# Patient Record
Sex: MALE | Race: WHITE | NOT HISPANIC OR LATINO | URBAN - METROPOLITAN AREA
[De-identification: names, ages, dates, MRNs, and addresses within clinical notes are randomized per-mention and may not be internally consistent; named-entity substitution may affect disease eponyms.]

---

## 2019-12-02 ENCOUNTER — OUTPATIENT (OUTPATIENT)
Dept: OUTPATIENT SERVICES | Facility: HOSPITAL | Age: 19
LOS: 1 days | End: 2019-12-02

## 2019-12-02 DIAGNOSIS — Z00.00 ENCOUNTER FOR GENERAL ADULT MEDICAL EXAMINATION WITHOUT ABNORMAL FINDINGS: ICD-10-CM

## 2020-04-26 ENCOUNTER — MESSAGE (OUTPATIENT)
Age: 20
End: 2020-04-26

## 2020-06-15 PROBLEM — Z00.00 ENCOUNTER FOR PREVENTIVE HEALTH EXAMINATION: Noted: 2020-06-15

## 2020-06-17 ENCOUNTER — APPOINTMENT (OUTPATIENT)
Dept: ENDOCRINOLOGY | Facility: CLINIC | Age: 20
End: 2020-06-17
Payer: COMMERCIAL

## 2020-06-17 DIAGNOSIS — Z80.9 FAMILY HISTORY OF MALIGNANT NEOPLASM, UNSPECIFIED: ICD-10-CM

## 2020-06-17 DIAGNOSIS — Z83.49 FAMILY HISTORY OF OTHER ENDOCRINE, NUTRITIONAL AND METABOLIC DISEASES: ICD-10-CM

## 2020-06-17 DIAGNOSIS — Z83.3 FAMILY HISTORY OF DIABETES MELLITUS: ICD-10-CM

## 2020-06-17 PROCEDURE — 99205 OFFICE O/P NEW HI 60 MIN: CPT | Mod: 95

## 2020-07-02 ENCOUNTER — TRANSCRIPTION ENCOUNTER (OUTPATIENT)
Age: 20
End: 2020-07-02

## 2020-07-02 ENCOUNTER — APPOINTMENT (OUTPATIENT)
Dept: ENDOCRINOLOGY | Facility: CLINIC | Age: 20
End: 2020-07-02
Payer: COMMERCIAL

## 2020-07-02 PROCEDURE — 99214 OFFICE O/P EST MOD 30 MIN: CPT | Mod: 95

## 2020-07-23 ENCOUNTER — APPOINTMENT (OUTPATIENT)
Dept: ENDOCRINOLOGY | Facility: CLINIC | Age: 20
End: 2020-07-23
Payer: COMMERCIAL

## 2020-07-23 ENCOUNTER — APPOINTMENT (OUTPATIENT)
Dept: ENDOCRINOLOGY | Facility: CLINIC | Age: 20
End: 2020-07-23

## 2020-07-23 PROCEDURE — 99442: CPT

## 2020-07-24 NOTE — ASSESSMENT
[FreeTextEntry1] : Joseline is a 20 year old transfeminine person (AMAB, pronouns they/them) h/o bilateral orchiectomy, low testosterone. They meet the diagnosis of gender dysphoria (at least 6 months of incongruence between  gender of male and expressed gender of nonbinary/transfeminine associated with clinically significant distress). They had been treated with testosterone previously but experienced gender dysphoria on treatment and it was thus stopped. They signed an informed consent form on 20 to begin estradiol treatment. \par \par #Gender dysphoria: \par -start estradiol 0.025mg patch twice weekly. counseled on how to apply.\par -recheck bloodwork in 1 month and RTC\par \par #Hyperprolactinemia: resolved.\par \par #Elevated ALT: likely due to fatty liver. This level of mild elevation is not a contraindication to starting estradiol patch.\par -obtain RUQ US\par -referred to GI\par -repeat LFTs in 1 month \par \par #Obesity/prediabetes: losing weight with calorie counting\par -Continue metformin 500mg ER bid\par -continue to calorie restrict and increase exercise if they can. \par

## 2020-07-24 NOTE — DATA REVIEWED
[FreeTextEntry1] : [facility] 7/20/20: total prolactin 7.8, monomeric prolactin 7.8, Hep B SAb non reactive, Hep B Surface Ag neg, Hep C Ab with reflex to RNA non reactive, Hep A IgM non reactive, HIV Ag/Ab 4th gen with reflex non reactive\par \par [facility] on 6/24/20: cr 0.71, AST 39, ALT 73 H (range 9-46), K 3.8, LDL 94, HDL 42, TG 79, T chol 153, TSH 2.8, fT4 1.2, WBC 11.2, prolactin 38.9 H (ref 2-18), estradiol 32, testosterone total 29, WBC 11.2.

## 2020-07-24 NOTE — HISTORY OF PRESENT ILLNESS
[FreeTextEntry1] : [employer] in [place]: phone [phone]\par \par Preferred name: Joseline. Pronouns: they/them\par \par Joseline is a 20 year old person h/o bilateral orchiectomy, low testosterone here for followup.\par \par #Low testosterone:\par -was following with Dr. Campos and then another endocrinologist (pediatric endocrinologist) after bilateral orchiectomy at age 4.\par -started testosterone gel [id number]. In 2015 started experiencing gender dysphoria and no longer felt comfortable on testosterone. Endocrinologist stopped testosterone and started raloxifene to prevent osteoporosis. They took raloxifene for 3 years. Was told that they had low bone density as a teenager.\par \par #Gender dysphoria:\par -first started having gender dysphoria when they started testosterone. Improved since they've been off testosterone\par -started learning about nonbinary identities the second year of high school and they felt like they were nonbinary. Saw a therapist for 2 yrs through senior year of high school for self harm and suicidal ideation\par -currently identify as transfeminine: feels more comfortable seeing themselves as a feminine person. \par -areas that cause dysphoria: genitalia, facial appearance. Patient is comfortable having additional breast tissue. Pt does not grow a beard - only has scant hair on upper lip. Goals for hormone tx: bigger breasts, different body composition. \par -Joseline would like to proceed with estradiol patches. \par \par #hyperprolactinemia: resolved\par \par #High ALT: workup showed negative screen for hepatitis A,B,C, HIV. \par \par #Obesity:\par -we started metformin 500mg ER bid on 7/2/20. \par -Joseline has been tracking calories with Lose It arpita and trying to eat less than 1850 Kcal a day. They still have limited exercise.\par -Pt reports being 6'4" and is currently 279 lbs (BMI 34), having lost 4 lbs in the past 2 weeks\par \par ROS: Per HPI. Review of constitutional, eyes, ENT, cardiovascular, respiratory, gastrointestinal, genitourinary, musculoskeletal, integumentary, neurological, psychiatric, endocrine and heme/lymph systems is otherwise negative.

## 2020-08-06 ENCOUNTER — APPOINTMENT (OUTPATIENT)
Dept: GASTROENTEROLOGY | Facility: CLINIC | Age: 20
End: 2020-08-06
Payer: COMMERCIAL

## 2020-08-06 PROCEDURE — 99202 OFFICE O/P NEW SF 15 MIN: CPT | Mod: 95

## 2020-08-07 NOTE — ASSESSMENT
[FreeTextEntry1] : 20 with PMHx orchiectomy and low testosterone referred by Dr. Gupta for elevated ALT via TELEMEDICINE. \par \par High ALT\par - repeat CMP as only have one level of elevation \par - will do full liver work up and ultrasound if still elevated \par \par "Stomach ache"\par - patient reports lactose being trigger\par - trial of 3 months with no dairy\par \par f/u with results\par \par Joan Brower NP \par \par time spent 20min

## 2020-08-07 NOTE — HISTORY OF PRESENT ILLNESS
[Home] : at home, [unfilled] , at the time of the visit. [Medical Office: ([facility])___] : at the medical office located in  [Verbal consent obtained from patient] : the patient, [unfilled] [de-identified] : 20 with PMHx orchiectomy and low testosterone referred by Dr. Gupta for elevated ALT via TELEMEDICINE. \par \par First time blood work in 3 years and ALT came back high on June 24, 2020 (ALT- 73) \par \par frequent "stomach aches" - above and behind belly button  (thinks due to lactose intolerance and continuing to eat dairy) \par \par no ultrasound of stomach \par \par testosterone ([id number]) treatments in high school - saw endocrinologist for transition estradiol (1.5 weeks ago). Metformin began after high liver enzyme \par \par 6'4 , 276.7 \par counting calories and watching carb intake \par \par \par takes vitamin D and calcium supplement  \par \par Fhx: father had renal cancer \par Etoh: once or twice a week\par smoking: none\par drug use: none \par 2004 orchiectomy

## 2020-08-13 ENCOUNTER — RX CHANGE (OUTPATIENT)
Age: 20
End: 2020-08-13

## 2020-09-04 ENCOUNTER — RX RENEWAL (OUTPATIENT)
Age: 20
End: 2020-09-04

## 2020-09-18 ENCOUNTER — APPOINTMENT (OUTPATIENT)
Dept: ENDOCRINOLOGY | Facility: CLINIC | Age: 20
End: 2020-09-18
Payer: COMMERCIAL

## 2020-09-18 PROCEDURE — 99213 OFFICE O/P EST LOW 20 MIN: CPT | Mod: 95

## 2020-09-18 RX ORDER — ESTRADIOL 0.03 MG/D
0.03 PATCH, EXTENDED RELEASE TRANSDERMAL
Qty: 8 | Refills: 1 | Status: COMPLETED | COMMUNITY
Start: 2020-07-23 | End: 2020-09-18

## 2020-09-22 ENCOUNTER — RX RENEWAL (OUTPATIENT)
Age: 20
End: 2020-09-22

## 2020-10-16 ENCOUNTER — RX RENEWAL (OUTPATIENT)
Age: 20
End: 2020-10-16

## 2020-11-05 ENCOUNTER — RX CHANGE (OUTPATIENT)
Age: 20
End: 2020-11-05

## 2020-11-12 ENCOUNTER — RX RENEWAL (OUTPATIENT)
Age: 20
End: 2020-11-12

## 2020-12-28 ENCOUNTER — APPOINTMENT (OUTPATIENT)
Dept: ENDOCRINOLOGY | Facility: CLINIC | Age: 20
End: 2020-12-28
Payer: COMMERCIAL

## 2020-12-28 DIAGNOSIS — Z86.39 PERSONAL HISTORY OF OTHER ENDOCRINE, NUTRITIONAL AND METABOLIC DISEASE: ICD-10-CM

## 2020-12-28 DIAGNOSIS — Z00.00 ENCOUNTER FOR GENERAL ADULT MEDICAL EXAMINATION W/OUT ABNORMAL FINDINGS: ICD-10-CM

## 2020-12-28 PROCEDURE — 99213 OFFICE O/P EST LOW 20 MIN: CPT | Mod: 95

## 2020-12-28 RX ORDER — ESTRADIOL 0.05 MG/D
0.05 PATCH, EXTENDED RELEASE TRANSDERMAL
Qty: 8 | Refills: 1 | Status: COMPLETED | COMMUNITY
Start: 2020-09-18 | End: 2020-12-28

## 2021-01-20 ENCOUNTER — RX RENEWAL (OUTPATIENT)
Age: 21
End: 2021-01-20

## 2021-01-21 ENCOUNTER — APPOINTMENT (OUTPATIENT)
Dept: GASTROENTEROLOGY | Facility: CLINIC | Age: 21
End: 2021-01-21
Payer: COMMERCIAL

## 2021-01-21 ENCOUNTER — APPOINTMENT (OUTPATIENT)
Dept: GASTROENTEROLOGY | Facility: CLINIC | Age: 21
End: 2021-01-21

## 2021-01-21 PROCEDURE — 99213 OFFICE O/P EST LOW 20 MIN: CPT | Mod: 95

## 2021-01-22 NOTE — ASSESSMENT
[FreeTextEntry1] : 20 with PMHx orchiectomy and low testosterone referred by Dr. Gupta for elevated LTs:\par \par Elevated LT s: prior ALT 73, trend upwards> 76> 83, recent AST also slightly elevated at 41, bili/ALP normal\par -no new meds or supplements , no alc use \par -check liver tox metformin cat B, estradiol cat A but both were started after ALt already up\par -will pursue full liver workup, including repeat LTs, hepatitis panel, MARJ,AMA,ALKM,ASMA,IgG,iron panel, ceruloplasmin\par -will check RUQ US with Doppler\par -Counselled about avoiding alc and herbal supplements \par -will follow up after above workup

## 2021-01-22 NOTE — HISTORY OF PRESENT ILLNESS
[Home] : at home, [unfilled] , at the time of the visit. [Medical Office: ([facility])___] : at the medical office located in  [Verbal consent obtained from patient] : the patient, [unfilled] [Heartburn] : denies heartburn [Nausea] : denies nausea [Vomiting] : denies vomiting [Diarrhea] : denies diarrhea [Constipation] : denies constipation [Yellow Skin Or Eyes (Jaundice)] : denies jaundice [Abdominal Swelling] : denies abdominal swelling [Abdominal Pain] : denies abdominal pain [Rectal Pain] : denies rectal pain [de-identified] : 20 with PMHx orchiectomy and low testosterone referred by Dr. Gupta for elevated ALT \par \par Reports feels well, only take estradiol and metformin and they both were started after ALT elevation was found. \par Had repeat labs in sep and nov 2020 and is for follow up regarding the same \par \par takes vitamin D and calcium supplement , no herbal supplements \par \par Fhx: father had renal cancer , no f/h of liver disease \par Etoh: none for the last one year, occasionally before that \par smoking: none\par drug use: none \par 2004 orchiectomy.

## 2021-04-07 ENCOUNTER — NON-APPOINTMENT (OUTPATIENT)
Age: 21
End: 2021-04-07

## 2021-04-07 LAB
ALBUMIN SERPL ELPH-MCNC: 4.8 G/DL
ALBUMIN SERPL ELPH-MCNC: 4.8 G/DL
ALP BLD-CCNC: 118 U/L
ALP BLD-CCNC: 119 U/L
ALT SERPL-CCNC: 36 U/L
ALT SERPL-CCNC: 39 U/L
ANION GAP SERPL CALC-SCNC: 19 MMOL/L
AST SERPL-CCNC: 22 U/L
AST SERPL-CCNC: 23 U/L
BASOPHILS # BLD AUTO: 0.08 K/UL
BASOPHILS NFR BLD AUTO: 0.9 %
BILIRUB DIRECT SERPL-MCNC: 0.1 MG/DL
BILIRUB INDIRECT SERPL-MCNC: 0.3 MG/DL
BILIRUB SERPL-MCNC: 0.4 MG/DL
BILIRUB SERPL-MCNC: 0.4 MG/DL
BUN SERPL-MCNC: 11 MG/DL
CALCIUM SERPL-MCNC: 9.9 MG/DL
CERULOPLASMIN SERPL-MCNC: 33 MG/DL
CHLORIDE SERPL-SCNC: 99 MMOL/L
CHOLEST SERPL-MCNC: 159 MG/DL
CO2 SERPL-SCNC: 23 MMOL/L
CREAT SERPL-MCNC: 0.67 MG/DL
CREAT SERPL-MCNC: 0.72 MG/DL
EOSINOPHIL # BLD AUTO: 0.16 K/UL
EOSINOPHIL NFR BLD AUTO: 1.8 %
ESTIMATED AVERAGE GLUCOSE: 108 MG/DL
ESTRADIOL SERPL-MCNC: 61 PG/ML
FERRITIN SERPL-MCNC: 58 NG/ML
GLUCOSE SERPL-MCNC: 104 MG/DL
HBA1C MFR BLD HPLC: 5.4 %
HBV CORE IGG+IGM SER QL: NONREACTIVE
HBV SURFACE AB SERPL IA-ACNC: 3.6 MIU/ML
HBV SURFACE AG SER QL: NONREACTIVE
HCT VFR BLD CALC: 45.1 %
HCV AB SER QL: NONREACTIVE
HCV S/CO RATIO: 0.1 S/CO
HDLC SERPL-MCNC: 41 MG/DL
HEPATITIS A IGG ANTIBODY: REACTIVE
HGB BLD-MCNC: 14.4 G/DL
IGG SER QL IEP: 1373 MG/DL
IMM GRANULOCYTES NFR BLD AUTO: 0.2 %
INR PPP: 0.87 RATIO
IRON SATN MFR SERPL: 15 %
IRON SERPL-MCNC: 62 UG/DL
LDLC SERPL CALC-MCNC: 101 MG/DL
LYMPHOCYTES # BLD AUTO: 1.92 K/UL
LYMPHOCYTES NFR BLD AUTO: 21.7 %
MAN DIFF?: NORMAL
MCHC RBC-ENTMCNC: 27.6 PG
MCHC RBC-ENTMCNC: 31.9 GM/DL
MCV RBC AUTO: 86.6 FL
MONOCYTES # BLD AUTO: 0.51 K/UL
MONOCYTES NFR BLD AUTO: 5.8 %
NEUTROPHILS # BLD AUTO: 6.17 K/UL
NEUTROPHILS NFR BLD AUTO: 69.6 %
NONHDLC SERPL-MCNC: 118 MG/DL
PLATELET # BLD AUTO: 275 K/UL
POTASSIUM SERPL-SCNC: 4 MMOL/L
PROLACTIN SERPL-MCNC: 10.9 NG/ML
PROT SERPL-MCNC: 8 G/DL
PROT SERPL-MCNC: 8.2 G/DL
PT BLD: 10.3 SEC
RBC # BLD: 5.21 M/UL
RBC # FLD: 13.1 %
SODIUM SERPL-SCNC: 141 MMOL/L
TIBC SERPL-MCNC: 398 UG/DL
TRIGL SERPL-MCNC: 83 MG/DL
UIBC SERPL-MCNC: 336 UG/DL
WBC # FLD AUTO: 8.86 K/UL

## 2021-04-08 ENCOUNTER — APPOINTMENT (OUTPATIENT)
Dept: ENDOCRINOLOGY | Facility: CLINIC | Age: 21
End: 2021-04-08
Payer: COMMERCIAL

## 2021-04-08 DIAGNOSIS — R79.89 OTHER SPECIFIED ABNORMAL FINDINGS OF BLOOD CHEMISTRY: ICD-10-CM

## 2021-04-08 PROCEDURE — 99212 OFFICE O/P EST SF 10 MIN: CPT | Mod: 95

## 2021-04-08 RX ORDER — METFORMIN ER 500 MG 500 MG/1
500 TABLET ORAL
Qty: 60 | Refills: 2 | Status: COMPLETED | COMMUNITY
Start: 2020-07-02 | End: 2021-04-08

## 2021-04-12 LAB
ANA SER IF-ACNC: NEGATIVE
LKM AB SER QL IF: <20.1 UNITS
MITOCHONDRIA AB SER IF-ACNC: NORMAL
SMOOTH MUSCLE AB SER QL IF: NORMAL

## 2021-11-12 ENCOUNTER — NON-APPOINTMENT (OUTPATIENT)
Age: 21
End: 2021-11-12

## 2021-11-24 ENCOUNTER — APPOINTMENT (OUTPATIENT)
Dept: ENDOCRINOLOGY | Facility: CLINIC | Age: 21
End: 2021-11-24
Payer: COMMERCIAL

## 2021-11-24 ENCOUNTER — NON-APPOINTMENT (OUTPATIENT)
Age: 21
End: 2021-11-24

## 2021-11-24 VITALS
BODY MASS INDEX: 34.7 KG/M2 | HEIGHT: 76 IN | DIASTOLIC BLOOD PRESSURE: 74 MMHG | WEIGHT: 285 LBS | SYSTOLIC BLOOD PRESSURE: 135 MMHG | HEART RATE: 108 BPM

## 2021-11-24 DIAGNOSIS — R74.01 ELEVATION OF LEVELS OF LIVER TRANSAMINASE LEVELS: ICD-10-CM

## 2021-11-24 PROCEDURE — 99215 OFFICE O/P EST HI 40 MIN: CPT

## 2021-11-24 RX ORDER — BLOOD-GLUCOSE METER
70 EACH MISCELLANEOUS
Qty: 100 | Refills: 2 | Status: ACTIVE | COMMUNITY
Start: 2021-11-24 | End: 1900-01-01

## 2021-11-24 RX ORDER — CONTAINER,EMPTY
EACH MISCELLANEOUS
Qty: 1 | Refills: 5 | Status: ACTIVE | COMMUNITY
Start: 2021-11-24 | End: 1900-01-01

## 2021-11-24 RX ORDER — ESTRADIOL 0.1 MG/D
0.1 PATCH TRANSDERMAL
Qty: 24 | Refills: 1 | Status: COMPLETED | COMMUNITY
Start: 2020-12-28 | End: 2021-11-24

## 2021-11-29 LAB
ALBUMIN SERPL ELPH-MCNC: 4.8 G/DL
ALP BLD-CCNC: 112 U/L
ALT SERPL-CCNC: 67 U/L
ANION GAP SERPL CALC-SCNC: 15 MMOL/L
AST SERPL-CCNC: 30 U/L
BILIRUB SERPL-MCNC: 0.3 MG/DL
BUN SERPL-MCNC: 17 MG/DL
CALCIUM SERPL-MCNC: 10.1 MG/DL
CHLORIDE SERPL-SCNC: 103 MMOL/L
CO2 SERPL-SCNC: 24 MMOL/L
CREAT SERPL-MCNC: 0.72 MG/DL
ESTIMATED AVERAGE GLUCOSE: 117 MG/DL
ESTRADIOL SERPL-MCNC: 15 PG/ML
GLUCOSE SERPL-MCNC: 107 MG/DL
HBA1C MFR BLD HPLC: 5.7 %
POTASSIUM SERPL-SCNC: 4.6 MMOL/L
PROT SERPL-MCNC: 8.2 G/DL
SODIUM SERPL-SCNC: 142 MMOL/L
TESTOST SERPL-MCNC: 19.6 NG/DL

## 2021-12-09 ENCOUNTER — RESULT REVIEW (OUTPATIENT)
Age: 21
End: 2021-12-09

## 2021-12-09 ENCOUNTER — APPOINTMENT (OUTPATIENT)
Dept: RADIOLOGY | Facility: CLINIC | Age: 21
End: 2021-12-09
Payer: SUBSIDIZED

## 2021-12-09 PROCEDURE — 77085 DXA BONE DENSITY AXL VRT FX: CPT | Mod: 26

## 2022-01-24 ENCOUNTER — APPOINTMENT (OUTPATIENT)
Dept: ENDOCRINOLOGY | Facility: CLINIC | Age: 22
End: 2022-01-24
Payer: COMMERCIAL

## 2022-01-24 VITALS — DIASTOLIC BLOOD PRESSURE: 88 MMHG | HEART RATE: 98 BPM | WEIGHT: 299 LBS | SYSTOLIC BLOOD PRESSURE: 137 MMHG

## 2022-01-24 PROCEDURE — 99214 OFFICE O/P EST MOD 30 MIN: CPT

## 2022-01-24 RX ORDER — ESTRADIOL 2 MG/1
2 TABLET ORAL
Qty: 120 | Refills: 0 | Status: COMPLETED | COMMUNITY
Start: 2021-11-24 | End: 2022-01-24

## 2022-01-25 LAB
ALBUMIN SERPL ELPH-MCNC: 4.8 G/DL
ALP BLD-CCNC: 117 U/L
ALT SERPL-CCNC: 55 U/L
AST SERPL-CCNC: 33 U/L
BILIRUB DIRECT SERPL-MCNC: 0.1 MG/DL
BILIRUB INDIRECT SERPL-MCNC: 0.2 MG/DL
BILIRUB SERPL-MCNC: 0.3 MG/DL
ESTIMATED AVERAGE GLUCOSE: 114 MG/DL
ESTRADIOL SERPL-MCNC: 26 PG/ML
HBA1C MFR BLD HPLC: 5.6 %
PROT SERPL-MCNC: 7.8 G/DL

## 2022-01-31 ENCOUNTER — APPOINTMENT (OUTPATIENT)
Dept: ENDOCRINOLOGY | Facility: CLINIC | Age: 22
End: 2022-01-31
Payer: COMMERCIAL

## 2022-01-31 PROCEDURE — 99213 OFFICE O/P EST LOW 20 MIN: CPT

## 2022-01-31 NOTE — PHYSICAL EXAM
[Alert] : alert [No Acute Distress] : no acute distress [Normal Sclera/Conjunctiva] : normal sclera/conjunctiva [Normal Hearing] : hearing was normal [No Respiratory Distress] : no respiratory distress [No Accessory Muscle Use] : no accessory muscle use [Normal Rate and Effort] : normal respiratory rate and effort [Normal Gait] : normal gait [No Involuntary Movements] : no involuntary movements were seen [Normal Strength/Tone] : muscle strength and tone were normal [No Rash] : no rash [No Skin Lesions] : no skin lesions [No Tremors] : no tremors [Oriented x3] : oriented to person, place, and time [Normal Affect] : the affect was normal [Normal Insight/Judgement] : insight and judgment were intact [Normal Mood] : the mood was normal

## 2022-01-31 NOTE — HISTORY OF PRESENT ILLNESS
[FreeTextEntry1] : Josleine is a 21 year old transfeminine person h/o bilateral orchiectomy, low testosterone who started a feminizing regimen 7/24/20. \par They are here for injection training.\par Estradiol 0.5ml IM every 2 weeks ordered by Dr Gupta.\par They brought all supplies and medication.\par We reviewed drawing up medication dose and injecting IM into thigh.

## 2022-01-31 NOTE — ASSESSMENT
[FreeTextEntry1] : Joseline is a 21 year old transfeminine person h/o bilateral orchiectomy, low testosterone who started a feminizing regimen 7/24/20 presents for injection training.\par They successfully colton up estradiol 0.5ml and self-injected IM into right outer third of thigh. They tolerated well. All questions answered. They feel comfortable self-injecting at home going forward.\par Provided written instructions and directed to internet tutorials if reinforcements is needed when self-administering in 2 weeks.\par  [Injection Technique, Storage, Sharps Disposal] : injection technique, storage, and sharps disposal

## 2022-02-08 RX ORDER — ESTRADIOL 2 MG/1
2 TABLET ORAL TWICE DAILY
Qty: 28 | Refills: 0 | Status: COMPLETED | COMMUNITY
Start: 2022-01-24 | End: 2022-02-08

## 2022-04-18 LAB — TESTOST SERPL-MCNC: 26.3 NG/DL

## 2022-04-25 ENCOUNTER — APPOINTMENT (OUTPATIENT)
Dept: ENDOCRINOLOGY | Facility: CLINIC | Age: 22
End: 2022-04-25
Payer: COMMERCIAL

## 2022-04-25 VITALS — SYSTOLIC BLOOD PRESSURE: 127 MMHG | WEIGHT: 311 LBS | DIASTOLIC BLOOD PRESSURE: 82 MMHG | HEART RATE: 111 BPM

## 2022-04-25 PROCEDURE — 99215 OFFICE O/P EST HI 40 MIN: CPT

## 2022-04-26 LAB
ALBUMIN SERPL ELPH-MCNC: 4.4 G/DL
ALP BLD-CCNC: 127 U/L
ALT SERPL-CCNC: 72 U/L
AST SERPL-CCNC: 38 U/L
BILIRUB DIRECT SERPL-MCNC: 0.1 MG/DL
BILIRUB INDIRECT SERPL-MCNC: 0.2 MG/DL
BILIRUB SERPL-MCNC: 0.2 MG/DL
ESTRADIOL SERPL-MCNC: 77 PG/ML
PROLACTIN SERPL-MCNC: 12.1 NG/ML
PROT SERPL-MCNC: 7.8 G/DL

## 2022-04-28 ENCOUNTER — TRANSCRIPTION ENCOUNTER (OUTPATIENT)
Age: 22
End: 2022-04-28

## 2022-04-28 RX ORDER — ESTRADIOL VALERATE 20 MG/ML
20 INJECTION INTRAMUSCULAR
Qty: 15 | Refills: 2 | Status: COMPLETED | COMMUNITY
Start: 2021-11-24 | End: 2022-04-28

## 2022-05-10 ENCOUNTER — NON-APPOINTMENT (OUTPATIENT)
Age: 22
End: 2022-05-10

## 2022-05-10 RX ORDER — ESTRADIOL VALERATE 40 MG/ML
40 INJECTION INTRAMUSCULAR
Qty: 1 | Refills: 1 | Status: COMPLETED | COMMUNITY
Start: 2022-04-28 | End: 2022-05-10

## 2022-07-11 ENCOUNTER — LABORATORY RESULT (OUTPATIENT)
Age: 22
End: 2022-07-11

## 2022-07-18 ENCOUNTER — APPOINTMENT (OUTPATIENT)
Dept: ENDOCRINOLOGY | Facility: CLINIC | Age: 22
End: 2022-07-18

## 2022-07-18 VITALS
BODY MASS INDEX: 37.99 KG/M2 | SYSTOLIC BLOOD PRESSURE: 147 MMHG | DIASTOLIC BLOOD PRESSURE: 75 MMHG | HEART RATE: 108 BPM | WEIGHT: 312 LBS | HEIGHT: 76 IN

## 2022-07-18 PROCEDURE — 99214 OFFICE O/P EST MOD 30 MIN: CPT

## 2022-10-10 ENCOUNTER — APPOINTMENT (OUTPATIENT)
Dept: ENDOCRINOLOGY | Facility: CLINIC | Age: 22
End: 2022-10-10

## 2022-10-10 VITALS — DIASTOLIC BLOOD PRESSURE: 71 MMHG | WEIGHT: 312 LBS | HEART RATE: 82 BPM | SYSTOLIC BLOOD PRESSURE: 108 MMHG

## 2022-10-10 PROCEDURE — 99215 OFFICE O/P EST HI 40 MIN: CPT

## 2022-12-27 ENCOUNTER — TRANSCRIPTION ENCOUNTER (OUTPATIENT)
Age: 22
End: 2022-12-27

## 2023-01-09 ENCOUNTER — APPOINTMENT (OUTPATIENT)
Dept: ENDOCRINOLOGY | Facility: CLINIC | Age: 23
End: 2023-01-09
Payer: COMMERCIAL

## 2023-01-09 VITALS — WEIGHT: 303 LBS | DIASTOLIC BLOOD PRESSURE: 75 MMHG | SYSTOLIC BLOOD PRESSURE: 141 MMHG | HEART RATE: 97 BPM

## 2023-01-09 DIAGNOSIS — R79.89 OTHER SPECIFIED ABNORMAL FINDINGS OF BLOOD CHEMISTRY: ICD-10-CM

## 2023-01-09 PROCEDURE — 99214 OFFICE O/P EST MOD 30 MIN: CPT

## 2023-02-13 ENCOUNTER — RX RENEWAL (OUTPATIENT)
Age: 23
End: 2023-02-13

## 2023-04-17 ENCOUNTER — APPOINTMENT (OUTPATIENT)
Dept: ENDOCRINOLOGY | Facility: CLINIC | Age: 23
End: 2023-04-17
Payer: COMMERCIAL

## 2023-04-17 ENCOUNTER — RX RENEWAL (OUTPATIENT)
Age: 23
End: 2023-04-17

## 2023-04-17 VITALS — SYSTOLIC BLOOD PRESSURE: 129 MMHG | DIASTOLIC BLOOD PRESSURE: 74 MMHG | WEIGHT: 306 LBS | HEART RATE: 91 BPM

## 2023-04-17 PROCEDURE — 99214 OFFICE O/P EST MOD 30 MIN: CPT

## 2023-04-17 RX ORDER — ORAL SEMAGLUTIDE 7 MG/1
7 TABLET ORAL DAILY
Qty: 90 | Refills: 1 | Status: COMPLETED | COMMUNITY
Start: 2022-12-27 | End: 2023-04-17

## 2023-04-20 ENCOUNTER — NON-APPOINTMENT (OUTPATIENT)
Age: 23
End: 2023-04-20

## 2023-05-16 ENCOUNTER — NON-APPOINTMENT (OUTPATIENT)
Age: 23
End: 2023-05-16

## 2023-05-23 ENCOUNTER — NON-APPOINTMENT (OUTPATIENT)
Age: 23
End: 2023-05-23

## 2023-06-23 ENCOUNTER — APPOINTMENT (OUTPATIENT)
Dept: PLASTIC SURGERY | Facility: CLINIC | Age: 23
End: 2023-06-23
Payer: COMMERCIAL

## 2023-06-23 ENCOUNTER — NON-APPOINTMENT (OUTPATIENT)
Age: 23
End: 2023-06-23

## 2023-06-23 VITALS
WEIGHT: 310 LBS | HEIGHT: 76 IN | RESPIRATION RATE: 16 BRPM | SYSTOLIC BLOOD PRESSURE: 124 MMHG | OXYGEN SATURATION: 94 % | BODY MASS INDEX: 37.75 KG/M2 | HEART RATE: 89 BPM | DIASTOLIC BLOOD PRESSURE: 79 MMHG

## 2023-06-23 PROCEDURE — 99204 OFFICE O/P NEW MOD 45 MIN: CPT

## 2023-06-23 NOTE — HISTORY OF PRESENT ILLNESS
[FreeTextEntry1] : 23-year-old woman designated male at birth (Joseline; she/they) presents for consultation for vaginoplasty.  She is interested in vaginal receptive intercourse with fingers, toys, and, possibly, penises.  She identifies as lesbian.  She has been on feminizing hormones for over 2 years.  She has a history of bilateral orchiectomy at age 1 for undescended testes.  She has not yet started hair removal.  She tucks most frequently with compressive underwear, rarely with tape.\par \par She works as a  for [employer].  She lives alone, but states she has several close friends, including one who is a a nurse, in the same building.  She denies nicotine use, occasionally drinks alcohol, and occasionally uses marijuana edibles.  She does not smoke.

## 2023-06-23 NOTE — PHYSICAL EXAM
[de-identified] : NAD.  BMI 37.7 [de-identified] : Normal respiratory effort [de-identified] : Genital exam was performed with an MA chaperone present (NQ).  There are testicular implants palpable adjacent to the midline of the nonpendulous scrotum.  There are no palpable hernias.  She is circumcised.  There are no visible skin lesions.  There is extremely limited available local tissue.

## 2023-06-23 NOTE — REVIEW OF SYSTEMS
[Vision Problems] : vision problems [Suicidal Ideation/Self-Harm] : suicidal ideation/self-harm [Negative] : Hematologic [MED-ROS-Eyes-FT] : Corrective lenses [MED-ROS-Psych-FT] : None for the last 7 years

## 2023-06-23 NOTE — ASSESSMENT
[FreeTextEntry1] : The patient has significant tissue limitations that preclude a typical penile inversion vaginoplasty.  She would require significant additional tissue from another site, and the creation of labia minora will be significantly limited.  A vulvoplasty would also have aesthetic limitations in this patient.  However, it is the opinion of this provider that vaginoplasty following vulvoplasty would not be significantly more difficult than vaginoplasty prior to vulvoplasty in this patient.\par \par  This patient might best be served by a peritoneal flap technique, which this center is unable to provide.  An intestinal vaginoplasty technique would be an option for this patient, as well, though I would recommend that only in the case of a needed revision.\par \par The patient was invited to obtain outside opinions, and return for further discussion should she desire.

## 2023-07-13 ENCOUNTER — NON-APPOINTMENT (OUTPATIENT)
Age: 23
End: 2023-07-13

## 2023-07-17 ENCOUNTER — RX RENEWAL (OUTPATIENT)
Age: 23
End: 2023-07-17

## 2023-08-18 ENCOUNTER — APPOINTMENT (OUTPATIENT)
Dept: ENDOCRINOLOGY | Facility: CLINIC | Age: 23
End: 2023-08-18
Payer: COMMERCIAL

## 2023-08-18 PROCEDURE — 99214 OFFICE O/P EST MOD 30 MIN: CPT | Mod: 95

## 2023-08-18 RX ORDER — SEMAGLUTIDE 0.68 MG/ML
2 INJECTION, SOLUTION SUBCUTANEOUS
Qty: 3 | Refills: 2 | Status: COMPLETED | COMMUNITY
Start: 2023-04-17 | End: 2023-08-18

## 2023-08-18 RX ORDER — SEMAGLUTIDE 1.34 MG/ML
2 INJECTION, SOLUTION SUBCUTANEOUS
Qty: 3 | Refills: 0 | Status: COMPLETED | COMMUNITY
Start: 2022-10-10 | End: 2023-08-18

## 2023-08-25 ENCOUNTER — RX RENEWAL (OUTPATIENT)
Age: 23
End: 2023-08-25

## 2023-08-25 RX ORDER — ESTRADIOL VALERATE 20 MG/ML
20 INJECTION INTRAMUSCULAR
Qty: 5 | Refills: 2 | Status: ACTIVE | COMMUNITY
Start: 2022-05-10 | End: 1900-01-01

## 2023-09-07 RX ORDER — SEMAGLUTIDE 0.25 MG/.5ML
0.25 INJECTION, SOLUTION SUBCUTANEOUS
Qty: 1 | Refills: 0 | Status: COMPLETED | COMMUNITY
Start: 2023-08-18 | End: 2023-09-07

## 2023-10-19 ENCOUNTER — APPOINTMENT (OUTPATIENT)
Dept: RADIOLOGY | Facility: CLINIC | Age: 23
End: 2023-10-19
Payer: SUBSIDIZED

## 2023-10-19 ENCOUNTER — OUTPATIENT (OUTPATIENT)
Dept: OUTPATIENT SERVICES | Facility: HOSPITAL | Age: 23
LOS: 1 days | End: 2023-10-19

## 2023-10-19 ENCOUNTER — RESULT REVIEW (OUTPATIENT)
Age: 23
End: 2023-10-19

## 2023-10-19 PROCEDURE — 77080 DXA BONE DENSITY AXIAL: CPT | Mod: 26

## 2023-11-16 RX ORDER — PROGESTERONE 100 MG/1
100 CAPSULE ORAL
Qty: 90 | Refills: 1 | Status: ACTIVE | COMMUNITY
Start: 2021-04-08 | End: 1900-01-01

## 2024-01-09 ENCOUNTER — APPOINTMENT (OUTPATIENT)
Dept: ENDOCRINOLOGY | Facility: CLINIC | Age: 24
End: 2024-01-09
Payer: COMMERCIAL

## 2024-01-09 VITALS
HEART RATE: 98 BPM | DIASTOLIC BLOOD PRESSURE: 79 MMHG | SYSTOLIC BLOOD PRESSURE: 132 MMHG | WEIGHT: 307 LBS | BODY MASS INDEX: 37.37 KG/M2

## 2024-01-09 DIAGNOSIS — F64.9 GENDER IDENTITY DISORDER, UNSPECIFIED: ICD-10-CM

## 2024-01-09 DIAGNOSIS — R73.03 PREDIABETES.: ICD-10-CM

## 2024-01-09 DIAGNOSIS — E66.9 OBESITY, UNSPECIFIED: ICD-10-CM

## 2024-01-09 DIAGNOSIS — K76.0 FATTY (CHANGE OF) LIVER, NOT ELSEWHERE CLASSIFIED: ICD-10-CM

## 2024-01-09 PROCEDURE — 99214 OFFICE O/P EST MOD 30 MIN: CPT

## 2024-01-09 RX ORDER — SEMAGLUTIDE 0.5 MG/.5ML
0.5 INJECTION, SOLUTION SUBCUTANEOUS
Qty: 3 | Refills: 1 | Status: COMPLETED | COMMUNITY
Start: 2023-09-07 | End: 2024-01-09

## 2024-04-17 RX ORDER — SYRINGE, DISPOSABLE, 1 ML
1 ML SYRINGE, EMPTY DISPOSABLE MISCELLANEOUS
Qty: 12 | Refills: 3 | Status: COMPLETED | COMMUNITY
Start: 2021-11-24 | End: 2024-04-17

## 2024-04-17 RX ORDER — SYRINGE WITH NEEDLE, 1 ML 25GX5/8"
23G X 1-1/2" SYRINGE, EMPTY DISPOSABLE MISCELLANEOUS
Qty: 12 | Refills: 3 | Status: ACTIVE | COMMUNITY
Start: 2023-04-17 | End: 1900-01-01

## 2024-04-17 RX ORDER — SYRINGE, DISPOSABLE, 1 ML
1 ML SYRINGE, EMPTY DISPOSABLE MISCELLANEOUS
Qty: 12 | Refills: 3 | Status: ACTIVE | COMMUNITY
Start: 2023-04-17 | End: 1900-01-01

## 2024-04-17 RX ORDER — NEEDLES, DISPOSABLE 25GX5/8"
20G X 1-1/2" NEEDLE, DISPOSABLE MISCELLANEOUS
Qty: 12 | Refills: 3 | Status: ACTIVE | COMMUNITY
Start: 2023-04-17 | End: 1900-01-01

## 2024-06-24 ENCOUNTER — TRANSCRIPTION ENCOUNTER (OUTPATIENT)
Age: 24
End: 2024-06-24

## 2024-08-12 ENCOUNTER — APPOINTMENT (OUTPATIENT)
Dept: ENDOCRINOLOGY | Facility: CLINIC | Age: 24
End: 2024-08-12
Payer: COMMERCIAL

## 2024-08-12 VITALS
WEIGHT: 300 LBS | SYSTOLIC BLOOD PRESSURE: 129 MMHG | DIASTOLIC BLOOD PRESSURE: 77 MMHG | HEART RATE: 86 BPM | BODY MASS INDEX: 36.52 KG/M2

## 2024-08-12 DIAGNOSIS — E66.9 OBESITY, UNSPECIFIED: ICD-10-CM

## 2024-08-12 DIAGNOSIS — K76.0 FATTY (CHANGE OF) LIVER, NOT ELSEWHERE CLASSIFIED: ICD-10-CM

## 2024-08-12 DIAGNOSIS — F64.9 GENDER IDENTITY DISORDER, UNSPECIFIED: ICD-10-CM

## 2024-08-12 DIAGNOSIS — R73.03 PREDIABETES.: ICD-10-CM

## 2024-08-12 PROCEDURE — G2211 COMPLEX E/M VISIT ADD ON: CPT

## 2024-08-12 PROCEDURE — 99214 OFFICE O/P EST MOD 30 MIN: CPT
